# Patient Record
Sex: MALE | Race: WHITE | NOT HISPANIC OR LATINO
[De-identification: names, ages, dates, MRNs, and addresses within clinical notes are randomized per-mention and may not be internally consistent; named-entity substitution may affect disease eponyms.]

---

## 2017-01-10 ENCOUNTER — MEDICATION RENEWAL (OUTPATIENT)
Age: 67
End: 2017-01-10

## 2017-01-11 ENCOUNTER — MEDICATION RENEWAL (OUTPATIENT)
Age: 67
End: 2017-01-11

## 2017-01-17 ENCOUNTER — APPOINTMENT (OUTPATIENT)
Dept: SURGERY | Facility: CLINIC | Age: 67
End: 2017-01-17

## 2017-01-17 VITALS
DIASTOLIC BLOOD PRESSURE: 76 MMHG | TEMPERATURE: 98.4 F | OXYGEN SATURATION: 98 % | RESPIRATION RATE: 17 BRPM | SYSTOLIC BLOOD PRESSURE: 156 MMHG | HEART RATE: 80 BPM

## 2017-01-17 DIAGNOSIS — R19.09 OTHER INTRA-ABDOMINAL AND PELVIC SWELLING, MASS AND LUMP: ICD-10-CM

## 2017-01-17 RX ORDER — SENNOSIDES 8.6 MG TABLETS 8.6 MG/1
8.6 TABLET ORAL
Refills: 0 | Status: ACTIVE | COMMUNITY

## 2017-01-20 ENCOUNTER — NON-APPOINTMENT (OUTPATIENT)
Age: 67
End: 2017-01-20

## 2017-01-20 ENCOUNTER — APPOINTMENT (OUTPATIENT)
Dept: CARDIOLOGY | Facility: CLINIC | Age: 67
End: 2017-01-20

## 2017-01-20 VITALS
WEIGHT: 159 LBS | HEART RATE: 92 BPM | BODY MASS INDEX: 24.1 KG/M2 | OXYGEN SATURATION: 95 % | HEIGHT: 68 IN | SYSTOLIC BLOOD PRESSURE: 136 MMHG | DIASTOLIC BLOOD PRESSURE: 72 MMHG

## 2017-01-20 RX ORDER — SILDENAFIL CITRATE 100 MG/1
100 TABLET, FILM COATED ORAL
Qty: 4 | Refills: 0 | Status: ACTIVE | COMMUNITY
Start: 2015-12-22

## 2017-01-26 ENCOUNTER — MEDICATION RENEWAL (OUTPATIENT)
Age: 67
End: 2017-01-26

## 2017-01-31 ENCOUNTER — OUTPATIENT (OUTPATIENT)
Dept: OUTPATIENT SERVICES | Facility: HOSPITAL | Age: 67
LOS: 1 days | Discharge: HOME | End: 2017-01-31

## 2017-01-31 VITALS
DIASTOLIC BLOOD PRESSURE: 64 MMHG | SYSTOLIC BLOOD PRESSURE: 123 MMHG | RESPIRATION RATE: 17 BRPM | HEIGHT: 68 IN | OXYGEN SATURATION: 97 % | HEART RATE: 80 BPM | WEIGHT: 166.45 LBS | TEMPERATURE: 98 F

## 2017-01-31 DIAGNOSIS — E78.5 HYPERLIPIDEMIA, UNSPECIFIED: ICD-10-CM

## 2017-01-31 DIAGNOSIS — Z01.818 ENCOUNTER FOR OTHER PREPROCEDURAL EXAMINATION: ICD-10-CM

## 2017-01-31 DIAGNOSIS — E11.9 TYPE 2 DIABETES MELLITUS WITHOUT COMPLICATIONS: ICD-10-CM

## 2017-01-31 DIAGNOSIS — K40.90 UNILATERAL INGUINAL HERNIA, WITHOUT OBSTRUCTION OR GANGRENE, NOT SPECIFIED AS RECURRENT: ICD-10-CM

## 2017-01-31 DIAGNOSIS — Z98.890 OTHER SPECIFIED POSTPROCEDURAL STATES: Chronic | ICD-10-CM

## 2017-01-31 DIAGNOSIS — R10.30 LOWER ABDOMINAL PAIN, UNSPECIFIED: ICD-10-CM

## 2017-01-31 LAB
ANION GAP SERPL CALC-SCNC: 6 MMOL/L — SIGNIFICANT CHANGE UP (ref 5–17)
BASOPHILS # BLD AUTO: 0.2 K/UL — SIGNIFICANT CHANGE UP (ref 0–0.2)
BASOPHILS NFR BLD AUTO: 1.8 % — SIGNIFICANT CHANGE UP (ref 0–2)
BUN SERPL-MCNC: 12 MG/DL — SIGNIFICANT CHANGE UP (ref 7–23)
CALCIUM SERPL-MCNC: 9.4 MG/DL — SIGNIFICANT CHANGE UP (ref 8.5–10.1)
CHLORIDE SERPL-SCNC: 106 MMOL/L — SIGNIFICANT CHANGE UP (ref 96–108)
CO2 SERPL-SCNC: 26 MMOL/L — SIGNIFICANT CHANGE UP (ref 22–31)
CREAT SERPL-MCNC: 0.95 MG/DL — SIGNIFICANT CHANGE UP (ref 0.5–1.3)
EOSINOPHIL # BLD AUTO: 0.3 K/UL — SIGNIFICANT CHANGE UP (ref 0–0.5)
EOSINOPHIL NFR BLD AUTO: 3 % — SIGNIFICANT CHANGE UP (ref 0–6)
GLUCOSE SERPL-MCNC: 196 MG/DL — HIGH (ref 70–99)
HCT VFR BLD CALC: 41.4 % — SIGNIFICANT CHANGE UP (ref 39–50)
HGB BLD-MCNC: 14.4 G/DL — SIGNIFICANT CHANGE UP (ref 13–17)
INR BLD: 0.89 RATIO — SIGNIFICANT CHANGE UP (ref 0.88–1.16)
LYMPHOCYTES # BLD AUTO: 1.3 K/UL — SIGNIFICANT CHANGE UP (ref 1–3.3)
LYMPHOCYTES # BLD AUTO: 11.3 % — LOW (ref 13–44)
MCHC RBC-ENTMCNC: 31 PG — SIGNIFICANT CHANGE UP (ref 27–34)
MCHC RBC-ENTMCNC: 35 GM/DL — SIGNIFICANT CHANGE UP (ref 32–36)
MCV RBC AUTO: 88.8 FL — SIGNIFICANT CHANGE UP (ref 80–100)
MONOCYTES # BLD AUTO: 0.7 K/UL — SIGNIFICANT CHANGE UP (ref 0–0.9)
MONOCYTES NFR BLD AUTO: 6 % — SIGNIFICANT CHANGE UP (ref 2–14)
NEUTROPHILS # BLD AUTO: 8.9 K/UL — HIGH (ref 1.8–7.4)
NEUTROPHILS NFR BLD AUTO: 77.9 % — HIGH (ref 43–77)
PCP SPEC-MCNC: SIGNIFICANT CHANGE UP
PLATELET # BLD AUTO: 287 K/UL — SIGNIFICANT CHANGE UP (ref 150–400)
POTASSIUM SERPL-MCNC: 4.1 MMOL/L — SIGNIFICANT CHANGE UP (ref 3.5–5.3)
POTASSIUM SERPL-SCNC: 4.1 MMOL/L — SIGNIFICANT CHANGE UP (ref 3.5–5.3)
PROTHROM AB SERPL-ACNC: 9.9 SEC — LOW (ref 10–13.1)
RBC # BLD: 4.66 M/UL — SIGNIFICANT CHANGE UP (ref 4.2–5.8)
RBC # FLD: 11.4 % — SIGNIFICANT CHANGE UP (ref 11–15)
SODIUM SERPL-SCNC: 138 MMOL/L — SIGNIFICANT CHANGE UP (ref 135–145)
WBC # BLD: 11.4 K/UL — HIGH (ref 3.8–10.5)
WBC # FLD AUTO: 11.4 K/UL — HIGH (ref 3.8–10.5)

## 2017-01-31 RX ORDER — ASPIRIN/CALCIUM CARB/MAGNESIUM 324 MG
1 TABLET ORAL
Qty: 0 | Refills: 0 | COMMUNITY

## 2017-01-31 RX ORDER — CAFFEINE 200 MG
4 TABLET ORAL
Qty: 0 | Refills: 0 | COMMUNITY

## 2017-01-31 RX ORDER — OMEGA-3 ACID ETHYL ESTERS 1 G
1 CAPSULE ORAL
Qty: 0 | Refills: 0 | COMMUNITY

## 2017-01-31 RX ORDER — ZOLPIDEM TARTRATE 10 MG/1
1 TABLET ORAL
Qty: 0 | Refills: 0 | COMMUNITY

## 2017-01-31 NOTE — H&P PST ADULT - NSANTHOSAYNRD_GEN_A_CORE
No. ARY screening performed.  STOP BANG Legend: 0-2 = LOW Risk; 3-4 = INTERMEDIATE Risk; 5-8 = HIGH Risk

## 2017-02-06 ENCOUNTER — APPOINTMENT (OUTPATIENT)
Dept: SURGERY | Facility: HOSPITAL | Age: 67
End: 2017-02-06

## 2017-02-14 ENCOUNTER — APPOINTMENT (OUTPATIENT)
Dept: SURGERY | Facility: CLINIC | Age: 67
End: 2017-02-14

## 2017-05-11 ENCOUNTER — MEDICATION RENEWAL (OUTPATIENT)
Age: 67
End: 2017-05-11

## 2017-05-12 ENCOUNTER — MEDICATION RENEWAL (OUTPATIENT)
Age: 67
End: 2017-05-12

## 2017-07-26 ENCOUNTER — APPOINTMENT (OUTPATIENT)
Dept: CARDIOLOGY | Facility: CLINIC | Age: 67
End: 2017-07-26

## 2017-07-26 RX ORDER — KETOCONAZOLE 20.5 MG/ML
2 SHAMPOO, SUSPENSION TOPICAL
Qty: 120 | Refills: 0 | Status: ACTIVE | COMMUNITY
Start: 2017-07-17

## 2017-09-19 ENCOUNTER — APPOINTMENT (OUTPATIENT)
Dept: CARDIOLOGY | Facility: CLINIC | Age: 67
End: 2017-09-19
Payer: MEDICARE

## 2017-09-19 PROCEDURE — 99214 OFFICE O/P EST MOD 30 MIN: CPT

## 2017-09-19 RX ORDER — POTASSIUM IODIDE 65 MG/ML
65 SOLUTION ORAL
Qty: 30 | Refills: 0 | Status: ACTIVE | COMMUNITY
Start: 2017-09-19

## 2017-10-05 ENCOUNTER — MEDICATION RENEWAL (OUTPATIENT)
Age: 67
End: 2017-10-05

## 2017-10-06 ENCOUNTER — MEDICATION RENEWAL (OUTPATIENT)
Age: 67
End: 2017-10-06

## 2017-10-16 ENCOUNTER — MEDICATION RENEWAL (OUTPATIENT)
Age: 67
End: 2017-10-16

## 2017-10-16 RX ORDER — LANCETS
EACH MISCELLANEOUS
Qty: 10 | Refills: 10 | Status: ACTIVE | COMMUNITY
Start: 2017-10-16 | End: 1900-01-01

## 2017-10-16 RX ORDER — BLOOD SUGAR DIAGNOSTIC
STRIP MISCELLANEOUS
Qty: 100 | Refills: 2 | Status: DISCONTINUED | COMMUNITY
Start: 2016-10-05 | End: 2017-10-16

## 2017-11-02 ENCOUNTER — MEDICATION RENEWAL (OUTPATIENT)
Age: 67
End: 2017-11-02

## 2018-01-29 ENCOUNTER — MEDICATION RENEWAL (OUTPATIENT)
Age: 68
End: 2018-01-29

## 2018-03-26 ENCOUNTER — MEDICATION RENEWAL (OUTPATIENT)
Age: 68
End: 2018-03-26

## 2018-03-28 ENCOUNTER — MEDICATION RENEWAL (OUTPATIENT)
Age: 68
End: 2018-03-28

## 2018-03-28 ENCOUNTER — RX RENEWAL (OUTPATIENT)
Age: 68
End: 2018-03-28

## 2018-05-03 ENCOUNTER — MEDICATION RENEWAL (OUTPATIENT)
Age: 68
End: 2018-05-03

## 2018-05-07 ENCOUNTER — RX RENEWAL (OUTPATIENT)
Age: 68
End: 2018-05-07

## 2018-05-22 ENCOUNTER — MEDICATION RENEWAL (OUTPATIENT)
Age: 68
End: 2018-05-22

## 2018-07-17 ENCOUNTER — RX RENEWAL (OUTPATIENT)
Age: 68
End: 2018-07-17

## 2018-08-07 ENCOUNTER — MEDICATION RENEWAL (OUTPATIENT)
Age: 68
End: 2018-08-07

## 2018-09-11 ENCOUNTER — RX RENEWAL (OUTPATIENT)
Age: 68
End: 2018-09-11

## 2018-09-11 PROBLEM — E78.5 HYPERLIPIDEMIA, UNSPECIFIED: Chronic | Status: ACTIVE | Noted: 2017-01-31

## 2018-09-11 PROBLEM — E11.9 TYPE 2 DIABETES MELLITUS WITHOUT COMPLICATIONS: Chronic | Status: ACTIVE | Noted: 2017-01-31

## 2018-10-01 ENCOUNTER — APPOINTMENT (OUTPATIENT)
Dept: CARDIOLOGY | Facility: CLINIC | Age: 68
End: 2018-10-01
Payer: MEDICARE

## 2018-10-01 ENCOUNTER — NON-APPOINTMENT (OUTPATIENT)
Age: 68
End: 2018-10-01

## 2018-10-01 VITALS
WEIGHT: 179 LBS | HEIGHT: 68 IN | DIASTOLIC BLOOD PRESSURE: 77 MMHG | HEART RATE: 80 BPM | SYSTOLIC BLOOD PRESSURE: 168 MMHG | OXYGEN SATURATION: 96 % | BODY MASS INDEX: 27.13 KG/M2 | TEMPERATURE: 99.8 F

## 2018-10-01 VITALS — DIASTOLIC BLOOD PRESSURE: 74 MMHG | SYSTOLIC BLOOD PRESSURE: 136 MMHG

## 2018-10-01 PROCEDURE — 36415 COLL VENOUS BLD VENIPUNCTURE: CPT

## 2018-10-01 PROCEDURE — 93000 ELECTROCARDIOGRAM COMPLETE: CPT

## 2018-10-01 PROCEDURE — 99214 OFFICE O/P EST MOD 30 MIN: CPT

## 2018-10-02 LAB
25(OH)D3 SERPL-MCNC: 28.1 NG/ML
ALBUMIN SERPL ELPH-MCNC: 5.1 G/DL
ALP BLD-CCNC: 103 U/L
ALT SERPL-CCNC: 25 U/L
ANION GAP SERPL CALC-SCNC: 15 MMOL/L
AST SERPL-CCNC: 14 U/L
BASOPHILS # BLD AUTO: 0.03 K/UL
BASOPHILS NFR BLD AUTO: 0.3 %
BILIRUB SERPL-MCNC: 0.4 MG/DL
BUN SERPL-MCNC: 16 MG/DL
CALCIUM SERPL-MCNC: 11.1 MG/DL
CHLORIDE SERPL-SCNC: 99 MMOL/L
CHOLEST SERPL-MCNC: 225 MG/DL
CHOLEST/HDLC SERPL: 2.4 RATIO
CO2 SERPL-SCNC: 24 MMOL/L
CREAT SERPL-MCNC: 1.04 MG/DL
EOSINOPHIL # BLD AUTO: 0.18 K/UL
EOSINOPHIL NFR BLD AUTO: 1.6 %
GLUCOSE SERPL-MCNC: 152 MG/DL
HBA1C MFR BLD HPLC: 6.8 %
HCT VFR BLD CALC: 45.5 %
HDLC SERPL-MCNC: 93 MG/DL
HGB BLD-MCNC: 15 G/DL
IMM GRANULOCYTES NFR BLD AUTO: 0.2 %
LDLC SERPL CALC-MCNC: 108 MG/DL
LYMPHOCYTES # BLD AUTO: 1.83 K/UL
LYMPHOCYTES NFR BLD AUTO: 15.9 %
MAN DIFF?: NORMAL
MCHC RBC-ENTMCNC: 30.5 PG
MCHC RBC-ENTMCNC: 33 GM/DL
MCV RBC AUTO: 92.5 FL
MONOCYTES # BLD AUTO: 0.68 K/UL
MONOCYTES NFR BLD AUTO: 5.9 %
NEUTROPHILS # BLD AUTO: 8.76 K/UL
NEUTROPHILS NFR BLD AUTO: 76.1 %
PLATELET # BLD AUTO: 382 K/UL
POTASSIUM SERPL-SCNC: 6 MMOL/L
PROT SERPL-MCNC: 7.5 G/DL
PSA FREE FLD-MCNC: 6.2
PSA FREE SERPL-MCNC: 0.16 NG/ML
PSA SERPL-MCNC: 2.58 NG/ML
RBC # BLD: 4.92 M/UL
RBC # FLD: 13.9 %
SODIUM SERPL-SCNC: 138 MMOL/L
T3FREE SERPL-MCNC: 2.46 PG/ML
TRIGL SERPL-MCNC: 122 MG/DL
TSH SERPL-ACNC: 1.46 UIU/ML
WBC # FLD AUTO: 11.5 K/UL

## 2018-10-10 ENCOUNTER — RX RENEWAL (OUTPATIENT)
Age: 68
End: 2018-10-10

## 2019-01-08 ENCOUNTER — MEDICATION RENEWAL (OUTPATIENT)
Age: 69
End: 2019-01-08

## 2019-01-14 ENCOUNTER — MEDICATION RENEWAL (OUTPATIENT)
Age: 69
End: 2019-01-14

## 2019-02-12 ENCOUNTER — MEDICATION RENEWAL (OUTPATIENT)
Age: 69
End: 2019-02-12

## 2019-04-15 ENCOUNTER — MEDICATION RENEWAL (OUTPATIENT)
Age: 69
End: 2019-04-15

## 2019-04-17 ENCOUNTER — MEDICATION RENEWAL (OUTPATIENT)
Age: 69
End: 2019-04-17

## 2019-06-24 ENCOUNTER — MEDICATION RENEWAL (OUTPATIENT)
Age: 69
End: 2019-06-24

## 2019-09-11 ENCOUNTER — RX RENEWAL (OUTPATIENT)
Age: 69
End: 2019-09-11

## 2019-09-11 ENCOUNTER — MEDICATION RENEWAL (OUTPATIENT)
Age: 69
End: 2019-09-11

## 2019-09-25 ENCOUNTER — RX RENEWAL (OUTPATIENT)
Age: 69
End: 2019-09-25

## 2019-11-11 ENCOUNTER — MEDICATION RENEWAL (OUTPATIENT)
Age: 69
End: 2019-11-11

## 2019-12-05 ENCOUNTER — MEDICATION RENEWAL (OUTPATIENT)
Age: 69
End: 2019-12-05

## 2019-12-10 ENCOUNTER — RX RENEWAL (OUTPATIENT)
Age: 69
End: 2019-12-10

## 2019-12-16 ENCOUNTER — MEDICATION RENEWAL (OUTPATIENT)
Age: 69
End: 2019-12-16

## 2019-12-24 ENCOUNTER — RX RENEWAL (OUTPATIENT)
Age: 69
End: 2019-12-24

## 2019-12-24 RX ORDER — DOCUSATE SODIUM 100 MG/1
100 CAPSULE ORAL
Qty: 180 | Refills: 0 | Status: ACTIVE | COMMUNITY
Start: 2019-12-24 | End: 1900-01-01

## 2020-03-17 ENCOUNTER — NON-APPOINTMENT (OUTPATIENT)
Age: 70
End: 2020-03-17

## 2020-03-17 ENCOUNTER — APPOINTMENT (OUTPATIENT)
Dept: CARDIOLOGY | Facility: CLINIC | Age: 70
End: 2020-03-17
Payer: MEDICARE

## 2020-03-17 VITALS
RESPIRATION RATE: 17 BRPM | SYSTOLIC BLOOD PRESSURE: 197 MMHG | BODY MASS INDEX: 25.31 KG/M2 | HEIGHT: 68 IN | WEIGHT: 167 LBS | HEART RATE: 91 BPM | DIASTOLIC BLOOD PRESSURE: 92 MMHG | TEMPERATURE: 98.4 F | OXYGEN SATURATION: 96 %

## 2020-03-17 DIAGNOSIS — R73.9 HYPERGLYCEMIA, UNSPECIFIED: ICD-10-CM

## 2020-03-17 DIAGNOSIS — F17.200 NICOTINE DEPENDENCE, UNSPECIFIED, UNCOMPLICATED: ICD-10-CM

## 2020-03-17 PROCEDURE — 93000 ELECTROCARDIOGRAM COMPLETE: CPT

## 2020-03-17 PROCEDURE — 99215 OFFICE O/P EST HI 40 MIN: CPT

## 2020-03-17 PROCEDURE — 36415 COLL VENOUS BLD VENIPUNCTURE: CPT

## 2020-03-17 RX ORDER — RISPERIDONE 25 MG/2 ML
25 KIT INTRAMUSCULAR
Qty: 1 | Refills: 0 | Status: DISCONTINUED | COMMUNITY
End: 2020-03-17

## 2020-03-17 NOTE — DISCUSSION/SUMMARY
[FreeTextEntry1] : This 70 year-old man with history as above. \par Since his last visit here, he has quit smoking and was allowed to discontinue his Risperdal (which had previously been court ordered).\par \par We discussed the importance of adding ASA, a statin, and an ACEi/ARB to his diabetes regimen.\par \par He reports that he already takes a daily ASA (full dose).\par The statin is important for primary prevention of cardiovascular disease.\par The ACEi/ARB is important to protect against proteinuria. Would also be helpful with blood pressure. He also took significant caffeine this morning, which may be contributing to his blood pressure. We discussed moderating the caffeine intake.\par \par We will consider these medications again going forward.

## 2020-03-17 NOTE — REASON FOR VISIT
[Follow-Up - Clinic] : a clinic follow-up of [Medication Management] : Medication management [FreeTextEntry1] : Mr. Cheema presents today for follow-up. He has been a longstanding patient of Stacie Pinto's at his former practice.

## 2020-03-17 NOTE — PHYSICAL EXAM
[General Appearance - Well Developed] : well developed [Normal Appearance] : normal appearance [Well Groomed] : well groomed [General Appearance - Well Nourished] : well nourished [General Appearance - In No Acute Distress] : no acute distress [Normal Oral Mucosa] : normal oral mucosa [No Oral Pallor] : no oral pallor [No Oral Cyanosis] : no oral cyanosis [Normal Oropharynx] : normal oropharynx [Normal Jugular Venous V Waves Present] : normal jugular venous V waves present [] : no respiratory distress [Respiration, Rhythm And Depth] : normal respiratory rhythm and effort [Exaggerated Use Of Accessory Muscles For Inspiration] : no accessory muscle use [Auscultation Breath Sounds / Voice Sounds] : lungs were clear to auscultation bilaterally [Bowel Sounds] : normal bowel sounds [Abdomen Soft] : soft [Abdomen Tenderness] : non-tender [Abnormal Walk] : normal gait [Gait - Sufficient For Exercise Testing] : the gait was sufficient for exercise testing [Nail Clubbing] : no clubbing of the fingernails [Cyanosis, Localized] : no localized cyanosis [Skin Color & Pigmentation] : normal skin color and pigmentation [No Venous Stasis] : no venous stasis [No Xanthoma] : no  xanthoma was observed [Oriented To Time, Place, And Person] : oriented to person, place, and time [Affect] : the affect was normal [Mood] : the mood was normal [No Anxiety] : not feeling anxious [Conjunctiva] : the conjunctiva were normal in both eyes [PERRL] : pupils were equal in size, round, and reactive to light [EOM Intact] : extraocular movements were intact [5th Left ICS - MCL] : palpated at the 5th LICS in the midclavicular line [Normal] : normal [No Precordial Heave] : no precordial heave was noted [Normal Rate] : normal [Rhythm Regular] : regular [Normal S1] : normal S1 [Normal S2] : normal S2 [No Gallop] : no gallop heard [No Murmur] : no murmurs heard [2+] : left 2+ [No Abnormalities] : the abdominal aorta was not enlarged and no bruit was heard [No Pitting Edema] : no pitting edema present [Yellow Sclera (Icteric)] : no scleral icterus was seen [Right Carotid Bruit] : no bruit heard over the right carotid [Left Carotid Bruit] : no bruit heard over the left carotid

## 2020-03-17 NOTE — HISTORY OF PRESENT ILLNESS
[FreeTextEntry1] : Patient presents today in his usual state of health. He does not drink coffee, but rather, he takes several caffeine pills per day. He reports they make him feel more awake. He denies chest pains, shortness of breath, or palpitations. He exercises regularly, but he plans to increase the amount of weight training he is doing. \par \par He has a Risperdal induced hyperglycemia. The Risperdal is currently at 25 mg every-two-weeks as court ordered. This court order was rescinded in August 2019.\par \par March 2020 - Patient presents here today for evaluation and medication renewals including his diabetes medications. He has stopped Risperdal, but he has continued metformin, Januvia, and glipizide. He has not had labs checked in almost 18 months.

## 2020-03-19 LAB
25(OH)D3 SERPL-MCNC: 30.5 NG/ML
ALBUMIN SERPL ELPH-MCNC: 4.8 G/DL
ALP BLD-CCNC: 104 U/L
ALT SERPL-CCNC: 14 U/L
ANION GAP SERPL CALC-SCNC: 16 MMOL/L
AST SERPL-CCNC: 13 U/L
BASOPHILS # BLD AUTO: 0.05 K/UL
BASOPHILS NFR BLD AUTO: 0.6 %
BILIRUB SERPL-MCNC: 0.2 MG/DL
BUN SERPL-MCNC: 17 MG/DL
CALCIUM SERPL-MCNC: 10.3 MG/DL
CHLORIDE SERPL-SCNC: 101 MMOL/L
CHOLEST SERPL-MCNC: 205 MG/DL
CHOLEST/HDLC SERPL: 2.8 RATIO
CO2 SERPL-SCNC: 21 MMOL/L
CREAT SERPL-MCNC: 1 MG/DL
EOSINOPHIL # BLD AUTO: 0.02 K/UL
EOSINOPHIL NFR BLD AUTO: 0.2 %
ESTIMATED AVERAGE GLUCOSE: 134 MG/DL
GLUCOSE SERPL-MCNC: 123 MG/DL
HBA1C MFR BLD HPLC: 6.3 %
HCT VFR BLD CALC: 45.5 %
HDLC SERPL-MCNC: 74 MG/DL
HGB BLD-MCNC: 14.8 G/DL
IMM GRANULOCYTES NFR BLD AUTO: 0.2 %
LDLC SERPL CALC-MCNC: 111 MG/DL
LYMPHOCYTES # BLD AUTO: 1.03 K/UL
LYMPHOCYTES NFR BLD AUTO: 12.2 %
MAN DIFF?: NORMAL
MCHC RBC-ENTMCNC: 29.8 PG
MCHC RBC-ENTMCNC: 32.5 GM/DL
MCV RBC AUTO: 91.7 FL
MONOCYTES # BLD AUTO: 0.7 K/UL
MONOCYTES NFR BLD AUTO: 8.3 %
NEUTROPHILS # BLD AUTO: 6.64 K/UL
NEUTROPHILS NFR BLD AUTO: 78.5 %
PLATELET # BLD AUTO: 314 K/UL
POTASSIUM SERPL-SCNC: 4.2 MMOL/L
PROT SERPL-MCNC: 7.2 G/DL
PSA FREE FLD-MCNC: 18 %
PSA FREE SERPL-MCNC: 0.19 NG/ML
PSA SERPL-MCNC: 1.09 NG/ML
RBC # BLD: 4.96 M/UL
RBC # FLD: 13 %
SODIUM SERPL-SCNC: 137 MMOL/L
TRIGL SERPL-MCNC: 104 MG/DL
TSH SERPL-ACNC: 1.21 UIU/ML
WBC # FLD AUTO: 8.46 K/UL

## 2020-05-27 ENCOUNTER — NON-APPOINTMENT (OUTPATIENT)
Age: 70
End: 2020-05-27

## 2020-05-27 ENCOUNTER — APPOINTMENT (OUTPATIENT)
Dept: CARDIOLOGY | Facility: CLINIC | Age: 70
End: 2020-05-27
Payer: MEDICARE

## 2020-05-27 VITALS — OXYGEN SATURATION: 99 % | HEART RATE: 101 BPM

## 2020-05-27 VITALS
OXYGEN SATURATION: 95 % | DIASTOLIC BLOOD PRESSURE: 81 MMHG | SYSTOLIC BLOOD PRESSURE: 156 MMHG | BODY MASS INDEX: 20 KG/M2 | WEIGHT: 132 LBS | HEART RATE: 115 BPM | HEIGHT: 68 IN | TEMPERATURE: 95.9 F

## 2020-05-27 PROCEDURE — 93000 ELECTROCARDIOGRAM COMPLETE: CPT

## 2020-05-27 PROCEDURE — 99214 OFFICE O/P EST MOD 30 MIN: CPT

## 2020-05-27 NOTE — PHYSICAL EXAM
[General Appearance - Well Developed] : well developed [Normal Appearance] : normal appearance [General Appearance - Well Nourished] : well nourished [Well Groomed] : well groomed [General Appearance - In No Acute Distress] : no acute distress [Normal Oral Mucosa] : normal oral mucosa [No Oral Pallor] : no oral pallor [No Oral Cyanosis] : no oral cyanosis [Normal Oropharynx] : normal oropharynx [Normal Jugular Venous V Waves Present] : normal jugular venous V waves present [Respiration, Rhythm And Depth] : normal respiratory rhythm and effort [] : no respiratory distress [Bowel Sounds] : normal bowel sounds [Exaggerated Use Of Accessory Muscles For Inspiration] : no accessory muscle use [Auscultation Breath Sounds / Voice Sounds] : lungs were clear to auscultation bilaterally [Abdomen Tenderness] : non-tender [Abdomen Soft] : soft [Gait - Sufficient For Exercise Testing] : the gait was sufficient for exercise testing [Abnormal Walk] : normal gait [Cyanosis, Localized] : no localized cyanosis [Nail Clubbing] : no clubbing of the fingernails [No Venous Stasis] : no venous stasis [Skin Color & Pigmentation] : normal skin color and pigmentation [No Xanthoma] : no  xanthoma was observed [Oriented To Time, Place, And Person] : oriented to person, place, and time [Affect] : the affect was normal [Mood] : the mood was normal [Conjunctiva] : the conjunctiva were normal in both eyes [No Anxiety] : not feeling anxious [PERRL] : pupils were equal in size, round, and reactive to light [EOM Intact] : extraocular movements were intact [5th Left ICS - MCL] : palpated at the 5th LICS in the midclavicular line [No Precordial Heave] : no precordial heave was noted [Normal] : normal [Normal Rate] : normal [Rhythm Regular] : regular [Normal S1] : normal S1 [Normal S2] : normal S2 [No Gallop] : no gallop heard [No Murmur] : no murmurs heard [2+] : left 2+ [No Pitting Edema] : no pitting edema present [No Abnormalities] : the abdominal aorta was not enlarged and no bruit was heard [Eyelids - No Xanthelasma] : the eyelids demonstrated no xanthelasmas [Normal Conjunctiva] : the conjunctiva exhibited no abnormalities [Heart Rate And Rhythm] : heart rate and rhythm were normal [Heart Sounds] : normal S1 and S2 [Murmurs] : no murmurs present [Yellow Sclera (Icteric)] : no scleral icterus was seen [Right Carotid Bruit] : no bruit heard over the right carotid [Left Carotid Bruit] : no bruit heard over the left carotid

## 2020-05-27 NOTE — DISCUSSION/SUMMARY
[FreeTextEntry1] : This 70 year-old man with history as above. \par Since his last visit here, he has quit smoking and was allowed to discontinue his Risperdal (which had previously been court ordered).\par \par We discussed the importance of adding ASA, a statin, and an ACEi/ARB to his diabetes regimen.\par \par He reports that he already takes a daily ASA (full dose).\par The statin is important for primary prevention of cardiovascular disease.\par The ACEi/ARB is important to protect against proteinuria. Would also be helpful with blood pressure. He also took significant caffeine this morning, which may be contributing to his blood pressure. We discussed moderating the caffeine intake.\par \par We will consider these medications again going forward.\par The patient was told in no uncertain terms that he must follow-up with an endocrinologist.  He was given the name and phone number of Dr. Lomas.

## 2020-05-27 NOTE — HISTORY OF PRESENT ILLNESS
[FreeTextEntry1] : Patient presents today in his usual state of health. He does not drink coffee, but rather, he takes several caffeine pills per day. He reports they make him feel more awake. He denies chest pains, shortness of breath, or palpitations. He exercises regularly, but he plans to increase the amount of weight training he is doing. \par \par He has a Risperdal induced hyperglycemia. The Risperdal is currently at 25 mg every-two-weeks as court ordered. This court order was rescinded in August 2019.\par \par March 2020 - Patient presents here today for evaluation and medication renewals including his diabetes medications. He has stopped Risperdal, but he has continued metformin, Januvia, and glipizide. He has not had labs checked in almost 18 months.\par May 27, 2020: The patient states that his diabetes and is out of control.  He states that he has been taking both the Metformin the glipizide and the Januvia.  He has not been to an endocrinologist.  He states that his last glucose was 297 despite taking these medicines.  He has had an apparent weight loss.\par

## 2020-08-17 ENCOUNTER — APPOINTMENT (OUTPATIENT)
Dept: UROLOGY | Facility: CLINIC | Age: 70
End: 2020-08-17
Payer: MEDICARE

## 2020-08-17 VITALS — TEMPERATURE: 97.8 F

## 2020-08-17 DIAGNOSIS — R00.0 TACHYCARDIA, UNSPECIFIED: ICD-10-CM

## 2020-08-17 DIAGNOSIS — F15.20 OTHER STIMULANT DEPENDENCE, UNCOMPLICATED: ICD-10-CM

## 2020-08-17 DIAGNOSIS — G47.9 SLEEP DISORDER, UNSPECIFIED: ICD-10-CM

## 2020-08-17 PROCEDURE — 51798 US URINE CAPACITY MEASURE: CPT

## 2020-08-17 PROCEDURE — 99204 OFFICE O/P NEW MOD 45 MIN: CPT | Mod: 25

## 2020-08-17 RX ORDER — PANTOPRAZOLE 40 MG/1
40 TABLET, DELAYED RELEASE ORAL
Qty: 30 | Refills: 0 | Status: ACTIVE | COMMUNITY
Start: 2020-07-05

## 2020-08-17 RX ORDER — BETHANECHOL CHLORIDE 10 MG/1
10 TABLET ORAL
Qty: 90 | Refills: 0 | Status: ACTIVE | COMMUNITY
Start: 2020-07-19

## 2020-08-17 RX ORDER — CHOLECALCIFEROL (VITAMIN D3) 1250 MCG
1.25 MG CAPSULE ORAL
Qty: 2 | Refills: 0 | Status: ACTIVE | COMMUNITY
Start: 2020-07-26

## 2020-08-17 NOTE — LETTER BODY
[FreeTextEntry1] : Trey Moore MD\par [address]\[place] [address]\par [phone]\par \par Dear Dr. Moore,\par \par Reason for Visit: BPH.\par \par This is a 70 year-old gentleman with history of head trauma with urinary retention presenting with symptoms of BPH. Patient is here today for evaluation. He currently takes Flomax and denies any urinary complaints. He is overall well. He denies any hematuria or urinary incontinence. His symptoms are aggravated by hydration. He reports no pain. All other review of systems are negative. His father had prostate cancer. He has no previous surgical history. Past medical history, family history and social history were inquired and were noncontributory to current condition. The patient does not use tobacco or drink alcohol. He is a former smoker. Medications and allergies were reviewed. He has no known allergies to medication. \par \par On examination, the patient is a healthy-appearing gentleman in no acute distress. He is alert and oriented follows commands. He has normal mood and affect. He is normocephalic. Neck is supple. Oral no thrush Respirations are unlabored. His abdomen is soft and nontender. Bladder is nonpalpable. No CVA tenderness. Neurologically he is grossly intact. No peripheral edema. Skin without gross abnormality. He has normal male external genitalia. Normal meatus. Bilateral testes are descended intrascrotally and normal to palpation. On rectal examination, there is normal sphincter tone. The prostate is clinically benign without focal induration or nodularity.\par \par Post-void residual on bladder scan today was 150 cc.\par \par ASSESSMENT: BPH\par \par I counseled the patient on the various etiology of his symptoms. I discussed the natural history of BPH and the treatment options available. I discussed the options of conservative management with fluid in dietary restrictions, herbal therapy, medical therapy, and minimally invasive procedures.  Risk and benefits were discussed. I answered his questions. I recommended he continue taking Flomax. I discussed the potential side effects of the medication. I counseled the patient on its use and side effects. If the patient develops any side effects, the patient will discontinue the medication and contact me. I recommend he obtain a BMP, PSA, culture, and urinalysis today for further evaluation. Risks and alternatives were discussed. I answered the patient questions. The patient will follow-up as directed and will contact me with any questions or concerns. Thank you for the opportunity to participate in the care of Mr. SMITH. I will keep you updated on his progress.\par \par Plan: Continue Flomax. BMP. PSA. Culture. Urinalysis.Follow up in 6 months.

## 2020-08-17 NOTE — ADDENDUM
[FreeTextEntry1] : Entered by Nj Lorenzo, acting as scribe for Dr. Trey Joyce.\par \par The documentation recorded by the scribe accurately reflects the service I personally performed and the decisions made by me.

## 2020-08-19 RX ORDER — BETHANECHOL CHLORIDE 10 MG/1
10 TABLET ORAL EVERY 6 HOURS
Qty: 360 | Refills: 3 | Status: COMPLETED | COMMUNITY
Start: 2020-08-19 | End: 2020-08-19

## 2020-09-01 ENCOUNTER — APPOINTMENT (OUTPATIENT)
Dept: NEUROLOGY | Facility: CLINIC | Age: 70
End: 2020-09-01
Payer: MEDICARE

## 2020-09-01 VITALS
HEIGHT: 68 IN | HEART RATE: 79 BPM | SYSTOLIC BLOOD PRESSURE: 203 MMHG | WEIGHT: 154 LBS | DIASTOLIC BLOOD PRESSURE: 79 MMHG | BODY MASS INDEX: 23.34 KG/M2

## 2020-09-01 VITALS — TEMPERATURE: 97.6 F

## 2020-09-01 DIAGNOSIS — S00.90XA UNSPECIFIED SUPERFICIAL INJURY OF UNSPECIFIED PART OF HEAD, INITIAL ENCOUNTER: ICD-10-CM

## 2020-09-01 PROCEDURE — 99203 OFFICE O/P NEW LOW 30 MIN: CPT

## 2020-09-01 NOTE — HISTORY OF PRESENT ILLNESS
[FreeTextEntry1] : 70-year-old man referred to my office for evaluation of head trauma. He claims his gait was unsteady as a result of poorly controlled diabetes mellitus and he fell striking the back of his head without loss of consciousness. He had a laceration that was sutured and he was admitted to the [facility]. He claims he was there for several weeks and transferred to the [facility]. No records are available for my review.\par \par He has a history of a psychiatric disorder was on neuroleptics.\par \par Currently lives alone. Has no contact with family. 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

## 2020-09-01 NOTE — PHYSICAL EXAM
[FreeTextEntry1] : His systolic blood pressure was elevated today and he claims is secondary to excessive caffeine intake.He is alert and oriented. Speech is pressured and tangential. No aphasia. Short-term recall intact able to spell in reverse knowing of a 5 letter word. Visual fields are full to confrontation. Pupils are equal and constrict to light. Extraocular movements intact. No hearing loss. Neck is supple. No bruits heard. No focal sensorimotor deficits. Gait is steady.

## 2020-09-20 ENCOUNTER — RX RENEWAL (OUTPATIENT)
Age: 70
End: 2020-09-20

## 2020-09-21 ENCOUNTER — APPOINTMENT (OUTPATIENT)
Dept: CARDIOLOGY | Facility: CLINIC | Age: 70
End: 2020-09-21
Payer: MEDICARE

## 2020-09-21 ENCOUNTER — NON-APPOINTMENT (OUTPATIENT)
Age: 70
End: 2020-09-21

## 2020-09-21 ENCOUNTER — LABORATORY RESULT (OUTPATIENT)
Age: 70
End: 2020-09-21

## 2020-09-21 VITALS
WEIGHT: 154 LBS | HEIGHT: 68 IN | BODY MASS INDEX: 23.34 KG/M2 | DIASTOLIC BLOOD PRESSURE: 80 MMHG | HEART RATE: 79 BPM | SYSTOLIC BLOOD PRESSURE: 173 MMHG | OXYGEN SATURATION: 98 %

## 2020-09-21 DIAGNOSIS — Z11.59 ENCOUNTER FOR SCREENING FOR OTHER VIRAL DISEASES: ICD-10-CM

## 2020-09-21 DIAGNOSIS — R39.89 OTHER SYMPTOMS AND SIGNS INVOLVING THE GENITOURINARY SYSTEM: ICD-10-CM

## 2020-09-21 PROCEDURE — 99214 OFFICE O/P EST MOD 30 MIN: CPT

## 2020-09-21 PROCEDURE — 36415 COLL VENOUS BLD VENIPUNCTURE: CPT

## 2020-09-21 PROCEDURE — 93000 ELECTROCARDIOGRAM COMPLETE: CPT

## 2020-09-21 RX ORDER — SIMVASTATIN 20 MG/1
20 TABLET, FILM COATED ORAL
Qty: 10 | Refills: 0 | Status: DISCONTINUED | COMMUNITY
Start: 2020-07-09 | End: 2020-09-21

## 2020-09-21 RX ORDER — TAMSULOSIN HYDROCHLORIDE 0.4 MG/1
0.4 CAPSULE ORAL
Qty: 90 | Refills: 3 | Status: DISCONTINUED | OUTPATIENT
Start: 2020-07-16 | End: 2020-09-21

## 2020-09-21 RX ORDER — NITROFURANTOIN (MONOHYDRATE/MACROCRYSTALS) 25; 75 MG/1; MG/1
100 CAPSULE ORAL
Qty: 10 | Refills: 0 | Status: DISCONTINUED | COMMUNITY
Start: 2020-07-21 | End: 2020-09-21

## 2020-09-21 NOTE — REASON FOR VISIT
[Follow-Up - Clinic] : a clinic follow-up of [Medication Management] : Medication management [FreeTextEntry1] : Mr. Cheema presents today for follow-up. He has been a longstanding patient of Stacie Cotton at his former practice.\par \par September 2020 - Patient returns today in his usual state of health. He believes he had a recent UTI which was treated with an antibiotic, but he continues to have urethral pain. He was seen by urology, but he does not want to go back there unless he knows he has a UTI.\par Since his last visit here, he fell and hit his head (no loss of consciousness). He attributes this fall to poor glycemic control.

## 2020-09-21 NOTE — DISCUSSION/SUMMARY
[FreeTextEntry1] : This 70 year-old man with history as above. \par Since his last visit here, he has quit smoking and was allowed to discontinue his Risperdal (which had previously been court ordered).\par \par He reports that he already takes a daily ASA (full dose).\par We discussed the importance of adding a statin, and an ACEi/ARB to his diabetes regimen.\par \par The statin is important for primary prevention of cardiovascular disease. Patient refuses statin.\par The ACEi/ARB is important to protect against proteinuria. Would also be helpful with blood pressure. He also took significant caffeine this morning, which may be contributing to his blood pressure. We discussed moderating the caffeine intake. He refuses blood pressure medication at this time.\par We will consider these medications again going forward.\par \par Patient declined flu shot at this time.

## 2020-09-21 NOTE — PHYSICAL EXAM
[General Appearance - Well Developed] : well developed [Normal Appearance] : normal appearance [Well Groomed] : well groomed [General Appearance - Well Nourished] : well nourished [General Appearance - In No Acute Distress] : no acute distress [Normal Conjunctiva] : the conjunctiva exhibited no abnormalities [Eyelids - No Xanthelasma] : the eyelids demonstrated no xanthelasmas [Normal Oral Mucosa] : normal oral mucosa [No Oral Pallor] : no oral pallor [No Oral Cyanosis] : no oral cyanosis [Normal Oropharynx] : normal oropharynx [Normal Jugular Venous V Waves Present] : normal jugular venous V waves present [] : no respiratory distress [Respiration, Rhythm And Depth] : normal respiratory rhythm and effort [Exaggerated Use Of Accessory Muscles For Inspiration] : no accessory muscle use [Auscultation Breath Sounds / Voice Sounds] : lungs were clear to auscultation bilaterally [Heart Rate And Rhythm] : heart rate and rhythm were normal [Heart Sounds] : normal S1 and S2 [Murmurs] : no murmurs present [Bowel Sounds] : normal bowel sounds [Abdomen Soft] : soft [Abdomen Tenderness] : non-tender [Abnormal Walk] : normal gait [Gait - Sufficient For Exercise Testing] : the gait was sufficient for exercise testing [Nail Clubbing] : no clubbing of the fingernails [Cyanosis, Localized] : no localized cyanosis [Skin Color & Pigmentation] : normal skin color and pigmentation [No Venous Stasis] : no venous stasis [No Xanthoma] : no  xanthoma was observed [Oriented To Time, Place, And Person] : oriented to person, place, and time [Affect] : the affect was normal [Mood] : the mood was normal [No Anxiety] : not feeling anxious [Conjunctiva] : the conjunctiva were normal in both eyes [PERRL] : pupils were equal in size, round, and reactive to light [EOM Intact] : extraocular movements were intact [5th Left ICS - MCL] : palpated at the 5th LICS in the midclavicular line [Normal] : normal [No Precordial Heave] : no precordial heave was noted [Normal Rate] : normal [Rhythm Regular] : regular [Normal S1] : normal S1 [Normal S2] : normal S2 [No Gallop] : no gallop heard [No Murmur] : no murmurs heard [2+] : left 2+ [No Abnormalities] : the abdominal aorta was not enlarged and no bruit was heard [No Pitting Edema] : no pitting edema present [FreeTextEntry1] : blister on left forearm  [Yellow Sclera (Icteric)] : no scleral icterus was seen [Right Carotid Bruit] : no bruit heard over the right carotid [Left Carotid Bruit] : no bruit heard over the left carotid

## 2020-09-22 ENCOUNTER — LABORATORY RESULT (OUTPATIENT)
Age: 70
End: 2020-09-22

## 2020-09-22 LAB
25(OH)D3 SERPL-MCNC: 52.7 NG/ML
ALBUMIN SERPL ELPH-MCNC: 5 G/DL
ALP BLD-CCNC: 123 U/L
ALT SERPL-CCNC: 27 U/L
ANION GAP SERPL CALC-SCNC: 14 MMOL/L
APPEARANCE: ABNORMAL
AST SERPL-CCNC: 21 U/L
BASOPHILS # BLD AUTO: 0.08 K/UL
BASOPHILS NFR BLD AUTO: 0.9 %
BILIRUB SERPL-MCNC: <0.2 MG/DL
BILIRUBIN URINE: NEGATIVE
BLOOD URINE: ABNORMAL
BUN SERPL-MCNC: 34 MG/DL
CALCIUM SERPL-MCNC: 10.5 MG/DL
CHLORIDE SERPL-SCNC: 97 MMOL/L
CHOLEST SERPL-MCNC: 249 MG/DL
CHOLEST/HDLC SERPL: 2.9 RATIO
CO2 SERPL-SCNC: 26 MMOL/L
COLOR: YELLOW
CREAT SERPL-MCNC: 1.46 MG/DL
CREAT SPEC-SCNC: 28 MG/DL
EOSINOPHIL # BLD AUTO: 0.2 K/UL
EOSINOPHIL NFR BLD AUTO: 2.3 %
ESTIMATED AVERAGE GLUCOSE: 146 MG/DL
GLUCOSE QUALITATIVE U: NEGATIVE
GLUCOSE SERPL-MCNC: 157 MG/DL
HBA1C MFR BLD HPLC: 6.7 %
HCT VFR BLD CALC: 43.4 %
HDLC SERPL-MCNC: 84 MG/DL
HGB BLD-MCNC: 13.1 G/DL
IMM GRANULOCYTES NFR BLD AUTO: 0.3 %
KETONES URINE: NEGATIVE
LDLC SERPL CALC-MCNC: 144 MG/DL
LEUKOCYTE ESTERASE URINE: ABNORMAL
LYMPHOCYTES # BLD AUTO: 0.81 K/UL
LYMPHOCYTES NFR BLD AUTO: 9.2 %
MAN DIFF?: NORMAL
MCHC RBC-ENTMCNC: 28.5 PG
MCHC RBC-ENTMCNC: 30.2 GM/DL
MCV RBC AUTO: 94.3 FL
MICROALBUMIN 24H UR DL<=1MG/L-MCNC: 5.7 MG/DL
MICROALBUMIN/CREAT 24H UR-RTO: 206 MG/G
MONOCYTES # BLD AUTO: 0.97 K/UL
MONOCYTES NFR BLD AUTO: 11 %
NEUTROPHILS # BLD AUTO: 6.72 K/UL
NEUTROPHILS NFR BLD AUTO: 76.3 %
NITRITE URINE: POSITIVE
PH URINE: 6.5
PLATELET # BLD AUTO: 359 K/UL
POTASSIUM SERPL-SCNC: 5.4 MMOL/L
PROT SERPL-MCNC: 7.7 G/DL
PROTEIN URINE: NORMAL
RBC # BLD: 4.6 M/UL
RBC # FLD: 15.6 %
SARS-COV-2 IGG SERPL IA-ACNC: 0.08 INDEX
SARS-COV-2 IGG SERPL QL IA: NEGATIVE
SODIUM SERPL-SCNC: 137 MMOL/L
SPECIFIC GRAVITY URINE: 1.01
TRIGL SERPL-MCNC: 99 MG/DL
TSH SERPL-ACNC: 2.62 UIU/ML
UROBILINOGEN URINE: NORMAL
WBC # FLD AUTO: 8.81 K/UL

## 2020-12-24 ENCOUNTER — RX RENEWAL (OUTPATIENT)
Age: 70
End: 2020-12-24

## 2021-02-17 ENCOUNTER — APPOINTMENT (OUTPATIENT)
Dept: CARDIOLOGY | Facility: CLINIC | Age: 71
End: 2021-02-17
Payer: MEDICARE

## 2021-02-17 PROCEDURE — 99214 OFFICE O/P EST MOD 30 MIN: CPT | Mod: 95

## 2021-02-22 NOTE — REASON FOR VISIT
[Follow-Up - Clinic] : a clinic follow-up of [Medication Management] : Medication management [FreeTextEntry1] : Mr. Cheema presents today for follow-up. He has been a longstanding patient of Stacie Cotton at his former practice.\par \par February 2021 - \par TeleHealth Encounter\par Initiated by: Patient election for TeleHealth visit\par Patient was consented for TeleHealth visit\par Patient Location: Home\par Physician Location: Office ([address] [place] [address], [address], [place], [place], [address])\par Duration of Encounter: 30 minutes, at least 50% of which was spent in direct counseling and coordination of care.\par \par Requesting Lunesta 3 mg renewal sent to [facility] [phone], (patient reports he changed from Ambien to Lunesta). \par He believes his [employer] contract is going to be cancelled as of 3/2/2021.\par \par Medicaid [id number], card number [id number]

## 2021-02-22 NOTE — DISCUSSION/SUMMARY
[FreeTextEntry1] : This 70 year-old man with history as above. \par He has quit smoking and was allowed to discontinue his Risperdal (which had previously been court ordered).\par \par He reports that he already takes a daily ASA (full dose).\par We discussed the importance of adding a statin, and an ACEi/ARB to his diabetes regimen.\par \par The statin is important for primary prevention of cardiovascular disease. Patient refuses statin.\par The ACEi/ARB is important to protect against proteinuria. Would also be helpful with blood pressure. He also took significant caffeine this morning, which may be contributing to his blood pressure. We discussed moderating the caffeine intake. He refuses blood pressure medication at this time.\par We will consider these medications again going forward.

## 2021-02-22 NOTE — HISTORY OF PRESENT ILLNESS
[FreeTextEntry1] : Patient presents today in his usual state of health. He does not drink coffee, but rather, he takes several caffeine pills per day. He reports they make him feel more awake. He denies chest pains, shortness of breath, or palpitations. He exercises regularly, but he plans to increase the amount of weight training he is doing. \par \par He has a Risperdal induced hyperglycemia. The Risperdal is currently at 25 mg every-two-weeks as court ordered. This court order was rescinded in August 2019.\par \par March 2020 - Patient presents here today for evaluation and medication renewals including his diabetes medications. He has stopped Risperdal, but he has continued metformin, Januvia, and glipizide. He has not had labs checked in almost 18 months.\par May 27, 2020: The patient states that his diabetes and is out of control.  He states that he has been taking both the Metformin the glipizide and the Januvia.  He has not been to an endocrinologist.  He states that his last glucose was 297 despite taking these medicines.  He has had an apparent weight loss.\par \par September 2020 - Patient returns today in his usual state of health. He believes he had a recent UTI which was treated with an antibiotic, but he continues to have urethral pain. He was seen by urology, but he does not want to go back there unless he knows he has a UTI.\par Since his last visit here, he fell and hit his head (no loss of consciousness). He attributes this fall to poor glycemic control.

## 2021-04-08 ENCOUNTER — RX RENEWAL (OUTPATIENT)
Age: 71
End: 2021-04-08

## 2021-04-11 ENCOUNTER — RX RENEWAL (OUTPATIENT)
Age: 71
End: 2021-04-11

## 2021-08-02 ENCOUNTER — RX RENEWAL (OUTPATIENT)
Age: 71
End: 2021-08-02

## 2021-10-14 ENCOUNTER — NON-APPOINTMENT (OUTPATIENT)
Age: 71
End: 2021-10-14

## 2021-10-29 ENCOUNTER — RX RENEWAL (OUTPATIENT)
Age: 71
End: 2021-10-29

## 2022-01-18 RX ORDER — COVID19 TEST ADM.BY PHARMACIST
MISCELLANEOUS MISCELLANEOUS
Qty: 1 | Refills: 0 | Status: ACTIVE | COMMUNITY
Start: 2022-01-17

## 2022-04-11 PROBLEM — Z11.59 SCREENING FOR VIRAL DISEASE: Status: ACTIVE | Noted: 2020-09-21

## 2022-05-03 ENCOUNTER — RX RENEWAL (OUTPATIENT)
Age: 72
End: 2022-05-03

## 2022-08-18 ENCOUNTER — RX RENEWAL (OUTPATIENT)
Age: 72
End: 2022-08-18

## 2022-09-15 ENCOUNTER — APPOINTMENT (OUTPATIENT)
Dept: CARDIOLOGY | Facility: CLINIC | Age: 72
End: 2022-09-15
Payer: MEDICARE

## 2022-09-15 ENCOUNTER — LABORATORY RESULT (OUTPATIENT)
Age: 72
End: 2022-09-15

## 2022-09-15 ENCOUNTER — NON-APPOINTMENT (OUTPATIENT)
Age: 72
End: 2022-09-15

## 2022-09-15 VITALS
SYSTOLIC BLOOD PRESSURE: 187 MMHG | DIASTOLIC BLOOD PRESSURE: 78 MMHG | BODY MASS INDEX: 23.49 KG/M2 | HEIGHT: 68 IN | OXYGEN SATURATION: 95 % | HEART RATE: 84 BPM | WEIGHT: 155 LBS | RESPIRATION RATE: 17 BRPM

## 2022-09-15 DIAGNOSIS — N40.1 BENIGN PROSTATIC HYPERPLASIA WITH LOWER URINARY TRACT SYMPMS: ICD-10-CM

## 2022-09-15 DIAGNOSIS — N13.8 BENIGN PROSTATIC HYPERPLASIA WITH LOWER URINARY TRACT SYMPMS: ICD-10-CM

## 2022-09-15 DIAGNOSIS — Z01.810 ENCOUNTER FOR PREPROCEDURAL CARDIOVASCULAR EXAMINATION: ICD-10-CM

## 2022-09-15 DIAGNOSIS — R23.8 OTHER SKIN CHANGES: ICD-10-CM

## 2022-09-15 PROCEDURE — 93000 ELECTROCARDIOGRAM COMPLETE: CPT | Mod: NC

## 2022-09-15 PROCEDURE — 99215 OFFICE O/P EST HI 40 MIN: CPT

## 2022-09-15 NOTE — DISCUSSION/SUMMARY
[EKG obtained to assist in diagnosis and management of assessed problem(s)] : EKG obtained to assist in diagnosis and management of assessed problem(s) [FreeTextEntry1] : This 72 year-old man with history as above. \par He has quit smoking and was allowed to discontinue his Risperdal (which had previously been court ordered).\par \par We discussed the importance of adding a statin, and an ACEi/ARB to his diabetes regimen.\par \par The statin is important for primary prevention of cardiovascular disease. Patient refuses statin.\par The ACEi/ARB is important to protect against proteinuria. Would also be helpful with blood pressure. He also took significant caffeine this morning, which may be contributing to his blood pressure. We discussed moderating the caffeine intake. He refuses blood pressure medication at this time.\par We will consider these medications again going forward. \par \par Patient is capable of > 4 METS at baseline.\par He is without acute coronary syndrome, decompensated heart failure, dangerous arrhythmia, or critical valvular stenosis. \par No further cardiovascular testing is necessary prior to proceeding with inguinal hernia repair.

## 2022-09-15 NOTE — CARDIOLOGY SUMMARY
[de-identified] : 1/20/2017, sinus 80 bpm with left atrial enlargement, normal QTc \par 10/1/2018, sinus 74 bpm with left atrial enlargement, normal QTc

## 2022-09-15 NOTE — REASON FOR VISIT
[CV Risk Factors and Non-Cardiac Disease] : CV risk factors and non-cardiac disease [FreeTextEntry1] : September 2022 - Patient returns to the office today for the first time in more than a year. He is hoping to pursue left inguinal hernia surgery with Caleb Cisse MD at [facility]. The surgery is not yet scheduled.\par He has no new cardiovascular complaints. He specifically denies chest pain, shortness of breath, palpitations, and lower extremity swelling.

## 2022-09-15 NOTE — PHYSICAL EXAM
[Well Developed] : well developed [Well Nourished] : well nourished [No Acute Distress] : no acute distress [Normal Conjunctiva] : normal conjunctiva [Normal Venous Pressure] : normal venous pressure [No Carotid Bruit] : no carotid bruit [Normal S1, S2] : normal S1, S2 [No Murmur] : no murmur [No Rub] : no rub [No Gallop] : no gallop [Clear Lung Fields] : clear lung fields [Good Air Entry] : good air entry [No Respiratory Distress] : no respiratory distress  [Soft] : abdomen soft [Non Tender] : non-tender [No Masses/organomegaly] : no masses/organomegaly [Normal Bowel Sounds] : normal bowel sounds [Normal Gait] : normal gait [No Edema] : no edema [No Cyanosis] : no cyanosis [No Clubbing] : no clubbing [No Varicosities] : no varicosities [No Rash] : no rash [No Skin Lesions] : no skin lesions [Moves all extremities] : moves all extremities [No Focal Deficits] : no focal deficits [Alert and Oriented] : alert and oriented [Normal memory] : normal memory [de-identified] : very loud speech

## 2022-09-15 NOTE — HISTORY OF PRESENT ILLNESS
[FreeTextEntry1] : Patient presents today in his usual state of health. He does not drink coffee, but rather, he takes several caffeine pills per day. He reports they make him feel more awake. He denies chest pains, shortness of breath, or palpitations. He exercises regularly, but he plans to increase the amount of weight training he is doing. \par \par He has a Risperdal induced hyperglycemia. The Risperdal is currently at 25 mg every-two-weeks as court ordered. This court order was rescinded in August 2019.\par \par March 2020 - Patient presents here today for evaluation and medication renewals including his diabetes medications. He has stopped Risperdal, but he has continued metformin, Januvia, and glipizide. He has not had labs checked in almost 18 months.\par May 27, 2020: The patient states that his diabetes and is out of control. He states that he has been taking both the Metformin the glipizide and the Januvia. He has not been to an endocrinologist. He states that his last glucose was 297 despite taking these medicines. He has had an apparent weight loss.\par \par September 2020 - Patient returns today in his usual state of health. He believes he had a recent UTI which was treated with an antibiotic, but he continues to have urethral pain. He was seen by urology, but he does not want to go back there unless he knows he has a UTI.\par Since his last visit here, he fell and hit his head (no loss of consciousness). He attributes this fall to poor glycemic control. \par \par February 2021 - \par TeleHealth Encounter\par Initiated by: Patient election for TeleHealth visit\par Patient was consented for TeleHealth visit\par Patient Location: Home\par Physician Location: Office ([address] [place] [address], [address], [place], [place], [address])\par Duration of Encounter: 30 minutes, at least 50% of which was spent in direct counseling and coordination of care.\par \par Requesting Lunesta 3 mg renewal sent to Sergio [phone], (patient reports he changed from Ambien to Lunesta). \par He believes his [employer] contract is going to be cancelled as of 3/2/2021.\par \par Medicaid [id number], card number [id number]

## 2022-09-19 LAB
25(OH)D3 SERPL-MCNC: 43.7 NG/ML
ALBUMIN SERPL ELPH-MCNC: 4.8 G/DL
ALP BLD-CCNC: 150 U/L
ALT SERPL-CCNC: 17 U/L
ANION GAP SERPL CALC-SCNC: 13 MMOL/L
APPEARANCE: ABNORMAL
AST SERPL-CCNC: 15 U/L
BASOPHILS # BLD AUTO: 0.07 K/UL
BASOPHILS NFR BLD AUTO: 0.7 %
BILIRUB SERPL-MCNC: 0.2 MG/DL
BILIRUBIN URINE: NEGATIVE
BLOOD URINE: NORMAL
BUN SERPL-MCNC: 18 MG/DL
CALCIUM SERPL-MCNC: 9.9 MG/DL
CHLORIDE SERPL-SCNC: 101 MMOL/L
CHOLEST SERPL-MCNC: 196 MG/DL
CO2 SERPL-SCNC: 23 MMOL/L
COLOR: NORMAL
CREAT SERPL-MCNC: 1.85 MG/DL
EGFR: 38 ML/MIN/1.73M2
EOSINOPHIL # BLD AUTO: 0.19 K/UL
EOSINOPHIL NFR BLD AUTO: 1.8 %
ESTIMATED AVERAGE GLUCOSE: 151 MG/DL
GLUCOSE QUALITATIVE U: NEGATIVE
GLUCOSE SERPL-MCNC: 172 MG/DL
HBA1C MFR BLD HPLC: 6.9 %
HCT VFR BLD CALC: 43.4 %
HDLC SERPL-MCNC: 70 MG/DL
HGB BLD-MCNC: 13.7 G/DL
IMM GRANULOCYTES NFR BLD AUTO: 0.4 %
INR PPP: 0.94 RATIO
KETONES URINE: NEGATIVE
LDLC SERPL CALC-MCNC: 116 MG/DL
LDLC SERPL DIRECT ASSAY-MCNC: 115 MG/DL
LEUKOCYTE ESTERASE URINE: ABNORMAL
LYMPHOCYTES # BLD AUTO: 0.66 K/UL
LYMPHOCYTES NFR BLD AUTO: 6.2 %
MAN DIFF?: NORMAL
MCHC RBC-ENTMCNC: 30 PG
MCHC RBC-ENTMCNC: 31.6 GM/DL
MCV RBC AUTO: 95 FL
MONOCYTES # BLD AUTO: 0.55 K/UL
MONOCYTES NFR BLD AUTO: 5.1 %
NEUTROPHILS # BLD AUTO: 9.21 K/UL
NEUTROPHILS NFR BLD AUTO: 85.8 %
NITRITE URINE: NEGATIVE
NONHDLC SERPL-MCNC: 126 MG/DL
PH URINE: 7.5
PLATELET # BLD AUTO: 336 K/UL
POTASSIUM SERPL-SCNC: 5.3 MMOL/L
PROT SERPL-MCNC: 7.4 G/DL
PROTEIN URINE: NORMAL
PSA FREE FLD-MCNC: 14 %
PSA FREE SERPL-MCNC: 0.18 NG/ML
PSA SERPL-MCNC: 1.33 NG/ML
PT BLD: 10.9 SEC
RBC # BLD: 4.57 M/UL
RBC # FLD: 13.3 %
SODIUM SERPL-SCNC: 137 MMOL/L
SPECIFIC GRAVITY URINE: 1.01
TESTOST SERPL-MCNC: 727 NG/DL
TRIGL SERPL-MCNC: 47 MG/DL
TSH SERPL-ACNC: 1.89 UIU/ML
UROBILINOGEN URINE: NORMAL
WBC # FLD AUTO: 10.72 K/UL

## 2022-10-31 ENCOUNTER — APPOINTMENT (OUTPATIENT)
Dept: UROLOGY | Facility: CLINIC | Age: 72
End: 2022-10-31

## 2022-10-31 VITALS — DIASTOLIC BLOOD PRESSURE: 79 MMHG | SYSTOLIC BLOOD PRESSURE: 143 MMHG | HEART RATE: 94 BPM

## 2022-10-31 DIAGNOSIS — R45.1 RESTLESSNESS AND AGITATION: ICD-10-CM

## 2022-10-31 DIAGNOSIS — R82.81 PYURIA: ICD-10-CM

## 2022-10-31 DIAGNOSIS — K40.90 UNILATERAL INGUINAL HERNIA, W/OUT OBSTRUCTION OR GANGRENE, NOT SPECIFIED AS RECURRENT: ICD-10-CM

## 2022-10-31 PROCEDURE — 99214 OFFICE O/P EST MOD 30 MIN: CPT

## 2022-10-31 NOTE — ASSESSMENT
[FreeTextEntry1] : 72 year old male patient H/o BPH presenting with high WBC on urinalysis. h/O symptomatic UTI 2 years ago\par \par Assessment/ Plan: \par \par After an extensive conversation with the patient regarding the finding of pyuria we suggested: :\par  \par 1 - send urine for analysis and culture to see if he has any bacteriuria on culture and treat it before surgery (even if asymptomatic)\par 2- US kidneys andBladder as the patient has high WBC on pervious urine studies and high PVR on prior evaluation 2 years ago\par 3- Patient will call back 2 days from now for results \par 4- follow up in 3 months  \par \par

## 2022-10-31 NOTE — HISTORY OF PRESENT ILLNESS
[FreeTextEntry1] : 72 year old male patient presenting today with urine analysis that showed high WBC count. Patient is scheduled for left hernia repair and states that he  needs antibiotic treatment for the urine.\par \par Patient was seen by dr Joyce 2 years ago for BPH symptoms and was prescribed Flomax but patient didn't take it. The patient apparently was treated for a symptomatic bacterial cystitis in Sept 2020 and had protracted course of urethral pain.  He currently has no urological complaints. He has no frequency, urgency, or dysuria. Review of EMR shows pyuria on prior urine study from last month.\par

## 2022-10-31 NOTE — PHYSICAL EXAM
[General Appearance - Well Developed] : well developed [General Appearance - In No Acute Distress] : no acute distress [Edema] : no peripheral edema [] : no respiratory distress [FreeTextEntry1] : A no

## 2022-11-01 LAB
APPEARANCE: ABNORMAL
BACTERIA: ABNORMAL
BILIRUBIN URINE: NEGATIVE
BLOOD URINE: ABNORMAL
COLOR: YELLOW
GLUCOSE QUALITATIVE U: NEGATIVE
HYALINE CASTS: 2 /LPF
KETONES URINE: NEGATIVE
LEUKOCYTE ESTERASE URINE: ABNORMAL
MICROSCOPIC-UA: NORMAL
NITRITE URINE: NEGATIVE
PH URINE: 6.5
PROTEIN URINE: ABNORMAL
RED BLOOD CELLS URINE: 1 /HPF
SPECIFIC GRAVITY URINE: 1.01
SQUAMOUS EPITHELIAL CELLS: 0 /HPF
UROBILINOGEN URINE: NORMAL
WHITE BLOOD CELLS URINE: 54 /HPF

## 2022-11-03 ENCOUNTER — APPOINTMENT (OUTPATIENT)
Dept: UROLOGY | Facility: CLINIC | Age: 72
End: 2022-11-03

## 2022-11-04 ENCOUNTER — NON-APPOINTMENT (OUTPATIENT)
Age: 72
End: 2022-11-04

## 2022-11-07 ENCOUNTER — NON-APPOINTMENT (OUTPATIENT)
Age: 72
End: 2022-11-07

## 2022-11-07 LAB — BACTERIA UR CULT: ABNORMAL

## 2022-11-07 RX ORDER — SULFAMETHOXAZOLE AND TRIMETHOPRIM 800; 160 MG/1; MG/1
800-160 TABLET ORAL
Qty: 14 | Refills: 0 | Status: ACTIVE | COMMUNITY
Start: 2022-11-07 | End: 1900-01-01

## 2022-12-19 ENCOUNTER — NON-APPOINTMENT (OUTPATIENT)
Age: 72
End: 2022-12-19

## 2022-12-21 RX ORDER — BLOOD SUGAR DIAGNOSTIC
STRIP MISCELLANEOUS
Qty: 90 | Refills: 2 | Status: ACTIVE | COMMUNITY
Start: 2018-07-17 | End: 1900-01-01

## 2023-05-09 ENCOUNTER — RX RENEWAL (OUTPATIENT)
Age: 73
End: 2023-05-09

## 2023-09-19 ENCOUNTER — EMERGENCY (EMERGENCY)
Facility: HOSPITAL | Age: 73
LOS: 1 days | Discharge: ANOTHER HOSPITAL | End: 2023-09-19
Admitting: EMERGENCY MEDICINE
Payer: MEDICARE

## 2023-09-19 VITALS
TEMPERATURE: 99 F | HEART RATE: 102 BPM | DIASTOLIC BLOOD PRESSURE: 88 MMHG | RESPIRATION RATE: 18 BRPM | OXYGEN SATURATION: 95 % | SYSTOLIC BLOOD PRESSURE: 181 MMHG

## 2023-09-19 DIAGNOSIS — F25.9 SCHIZOAFFECTIVE DISORDER, UNSPECIFIED: ICD-10-CM

## 2023-09-19 DIAGNOSIS — Z98.890 OTHER SPECIFIED POSTPROCEDURAL STATES: Chronic | ICD-10-CM

## 2023-09-19 LAB
ALBUMIN SERPL ELPH-MCNC: 4.5 G/DL — SIGNIFICANT CHANGE UP (ref 3.3–5)
ALP SERPL-CCNC: 101 U/L — SIGNIFICANT CHANGE UP (ref 40–120)
ALT FLD-CCNC: 16 U/L — SIGNIFICANT CHANGE UP (ref 4–41)
ANION GAP SERPL CALC-SCNC: 9 MMOL/L — SIGNIFICANT CHANGE UP (ref 7–14)
APAP SERPL-MCNC: <10 UG/ML — LOW (ref 15–25)
AST SERPL-CCNC: 30 U/L — SIGNIFICANT CHANGE UP (ref 4–40)
BASOPHILS # BLD AUTO: 0.06 K/UL — SIGNIFICANT CHANGE UP (ref 0–0.2)
BASOPHILS NFR BLD AUTO: 0.8 % — SIGNIFICANT CHANGE UP (ref 0–2)
BILIRUB SERPL-MCNC: 0.5 MG/DL — SIGNIFICANT CHANGE UP (ref 0.2–1.2)
BUN SERPL-MCNC: 28 MG/DL — HIGH (ref 7–23)
CALCIUM SERPL-MCNC: 9.7 MG/DL — SIGNIFICANT CHANGE UP (ref 8.4–10.5)
CHLORIDE SERPL-SCNC: 99 MMOL/L — SIGNIFICANT CHANGE UP (ref 98–107)
CO2 SERPL-SCNC: 29 MMOL/L — SIGNIFICANT CHANGE UP (ref 22–31)
CREAT SERPL-MCNC: 2.15 MG/DL — HIGH (ref 0.5–1.3)
EGFR: 32 ML/MIN/1.73M2 — LOW
EOSINOPHIL # BLD AUTO: 0.09 K/UL — SIGNIFICANT CHANGE UP (ref 0–0.5)
EOSINOPHIL NFR BLD AUTO: 1.2 % — SIGNIFICANT CHANGE UP (ref 0–6)
ETHANOL SERPL-MCNC: <10 MG/DL — SIGNIFICANT CHANGE UP
GLUCOSE SERPL-MCNC: 141 MG/DL — HIGH (ref 70–99)
HCT VFR BLD CALC: 42.9 % — SIGNIFICANT CHANGE UP (ref 39–50)
HGB BLD-MCNC: 13.9 G/DL — SIGNIFICANT CHANGE UP (ref 13–17)
IANC: 5.96 K/UL — SIGNIFICANT CHANGE UP (ref 1.8–7.4)
IMM GRANULOCYTES NFR BLD AUTO: 0.3 % — SIGNIFICANT CHANGE UP (ref 0–0.9)
LYMPHOCYTES # BLD AUTO: 0.84 K/UL — LOW (ref 1–3.3)
LYMPHOCYTES # BLD AUTO: 10.9 % — LOW (ref 13–44)
MCHC RBC-ENTMCNC: 30.2 PG — SIGNIFICANT CHANGE UP (ref 27–34)
MCHC RBC-ENTMCNC: 32.4 GM/DL — SIGNIFICANT CHANGE UP (ref 32–36)
MCV RBC AUTO: 93.1 FL — SIGNIFICANT CHANGE UP (ref 80–100)
MONOCYTES # BLD AUTO: 0.73 K/UL — SIGNIFICANT CHANGE UP (ref 0–0.9)
MONOCYTES NFR BLD AUTO: 9.5 % — SIGNIFICANT CHANGE UP (ref 2–14)
NEUTROPHILS # BLD AUTO: 5.96 K/UL — SIGNIFICANT CHANGE UP (ref 1.8–7.4)
NEUTROPHILS NFR BLD AUTO: 77.3 % — HIGH (ref 43–77)
NRBC # BLD: 0 /100 WBCS — SIGNIFICANT CHANGE UP (ref 0–0)
NRBC # FLD: 0 K/UL — SIGNIFICANT CHANGE UP (ref 0–0)
PLATELET # BLD AUTO: 338 K/UL — SIGNIFICANT CHANGE UP (ref 150–400)
POTASSIUM SERPL-MCNC: 4.9 MMOL/L — SIGNIFICANT CHANGE UP (ref 3.5–5.3)
POTASSIUM SERPL-SCNC: 4.9 MMOL/L — SIGNIFICANT CHANGE UP (ref 3.5–5.3)
PROT SERPL-MCNC: 7.5 G/DL — SIGNIFICANT CHANGE UP (ref 6–8.3)
RBC # BLD: 4.61 M/UL — SIGNIFICANT CHANGE UP (ref 4.2–5.8)
RBC # FLD: 13.5 % — SIGNIFICANT CHANGE UP (ref 10.3–14.5)
SALICYLATES SERPL-MCNC: <0.3 MG/DL — LOW (ref 15–30)
SARS-COV-2 RNA SPEC QL NAA+PROBE: SIGNIFICANT CHANGE UP
SODIUM SERPL-SCNC: 137 MMOL/L — SIGNIFICANT CHANGE UP (ref 135–145)
TSH SERPL-MCNC: 2.05 UIU/ML — SIGNIFICANT CHANGE UP (ref 0.27–4.2)
WBC # BLD: 7.7 K/UL — SIGNIFICANT CHANGE UP (ref 3.8–10.5)
WBC # FLD AUTO: 7.7 K/UL — SIGNIFICANT CHANGE UP (ref 3.8–10.5)

## 2023-09-19 PROCEDURE — 99285 EMERGENCY DEPT VISIT HI MDM: CPT

## 2023-09-19 PROCEDURE — 93010 ELECTROCARDIOGRAM REPORT: CPT

## 2023-09-19 PROCEDURE — 99285 EMERGENCY DEPT VISIT HI MDM: CPT | Mod: GC

## 2023-09-19 RX ORDER — HALOPERIDOL DECANOATE 100 MG/ML
5 INJECTION INTRAMUSCULAR ONCE
Refills: 0 | Status: COMPLETED | OUTPATIENT
Start: 2023-09-19 | End: 2023-09-19

## 2023-09-19 RX ORDER — DIPHENHYDRAMINE HCL 50 MG
25 CAPSULE ORAL ONCE
Refills: 0 | Status: COMPLETED | OUTPATIENT
Start: 2023-09-19 | End: 2023-09-19

## 2023-09-19 RX ADMIN — HALOPERIDOL DECANOATE 5 MILLIGRAM(S): 100 INJECTION INTRAMUSCULAR at 17:51

## 2023-09-19 RX ADMIN — Medication 1 MILLIGRAM(S): at 19:15

## 2023-09-19 RX ADMIN — Medication 25 MILLIGRAM(S): at 19:15

## 2023-09-19 NOTE — ED ADULT NURSE NOTE - OBJECTIVE STATEMENT
Pt arrives by ems from home, he barricaded himself in apartment, not taking psych medications, hoarding at his home and  speaking nonsensical on arrival. Pt denied si,hi,ah,vh,etoh, drug use. Hx of schizophrenia amd DM2.

## 2023-09-19 NOTE — ED BEHAVIORAL HEALTH ASSESSMENT NOTE - OTHER
+ paranoid and somatic delusions pending bed EMS activated by brother for decreased function including hoarding to the point building is taking legal action against him Brother Mesha Shaikh - [phone]

## 2023-09-19 NOTE — ED BEHAVIORAL HEALTH ASSESSMENT NOTE - SUMMARY
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 , with long history of schizoaffective disorder, bipolar type, with multiple hospitalizations and AOTs, (last hospitalized at [facility] in , then followed by [facility] under AOT for nonadherence; documentation notes chronic delusional interpretation of people's motivations and chronic paranoia despite adherence to Risperidal Consta; lost to follow-up after AOT  in ), no history of SA or substance misuse, PMHx of DM, HLD, HTN, CAD s/p angioplasy, brought in by EMS activated by brother for not being able to reach patient, + legal action by building for hoarding; patient barricaded himself in the apartment and would not allow police in for several hours.    Patient noted to have severe thought disorder with, tangential TP, loosened association, extremely difficult to follow; TC notable for paranoid ideations towards brother and towards others (e.g. police officers/EMS who knocked on his door today, his past psychiatrists and PMD) and somatic delusions (e.g. that previous Risperdal Consta may have elevated his BP and prostate enzymes), but largely pleasant, in behavioral control; despite endorsing "taking care of [him]self" and keeping up with shopping, eating, and fair hygiene, collateral concerning for chronic med nonadherence since AOT  in 2019, social isolation since passing of a close friend Enid in , and increasing inability to care for himself leading to building taking legal action against the patient for severe hoarding; interview notable for paranoid delusions, poor insight and judgement; noted ?pill-rolling tremor of b/l hands without bradykinesia, masked facies, unclear if volitional vs. PD vs. pseudoparkinsonism 2/2 many years of antipsychotic use; consider formal neuropsych testing before reinitiating neuroleptics

## 2023-09-19 NOTE — ED BEHAVIORAL HEALTH ASSESSMENT NOTE - RISK ASSESSMENT
Chronic risk factors include male sex, h/o psychiatric hospitalization, h/o AOT, h/o nonadherence, h/o mood/psychotic disorder, medical comorbidities, h/o chronic delusions even when compliant on Risperdal Cosnta    Acute risk factors include psychosis without CAH, rejection towards help, social isolation, nonadherence, not in care, poor insight/judgement, + legal action by building, severe hoarding    Protective factors include residential stability, intelligent, no h/o SI or SA     Given above, patient is unable to care for himself and does warrant inpatient hospitalization.

## 2023-09-19 NOTE — ED BEHAVIORAL HEALTH ASSESSMENT NOTE - DESCRIPTION
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 Vital signs initially hypertensive to 181/88, but later stabilized, see below. Patient calm and in behavioral control, but refusing bloodwork. PRN Haldol 5 mg IM given for labs.    Vital Signs Last 24 Hrs  T(C): 36.9 (19 Sep 2023 16:04), Max: 37.3 (19 Sep 2023 15:24)  T(F): 98.5 (19 Sep 2023 16:04), Max: 99.1 (19 Sep 2023 15:24)  HR: 86 (19 Sep 2023 16:04) (86 - 102)  BP: 144/66 (19 Sep 2023 16:04) (144/66 - 181/88)  BP(mean): --  RR: 17 (19 Sep 2023 16:04) (17 - 18)  SpO2: 98% (19 Sep 2023 16:04) (95% - 98%)    Parameters below as of 19 Sep 2023 16:04  Patient On (Oxygen Delivery Method): room air

## 2023-09-19 NOTE — ED BEHAVIORAL HEALTH ASSESSMENT NOTE - NSBHMSEINSIGHT_PSY_A_CORE
RN cannot approve Refill Request    RN can NOT refill this medication med is not covered by policy/route to provider. Last office visit: 10/11/2018 Agustín Craft MD Last Physical: 11/1/2017 Last MTM visit: Visit date not found Last visit same specialty: 10/11/2018 Agustín Craft MD.  Next visit within 3 mo: Visit date not found  Next physical within 3 mo: Visit date not found      Ashli Carvalho, [facility] Triage/Med Refill 1/11/2019    Requested Prescriptions   Pending Prescriptions Disp Refills     methocarbamol (ROBAXIN) 750 MG tablet [Pharmacy Med Name: METHOCARBAMOL 750 MG TABLET] 90 tablet 0     Sig: TAKE 1 TABLET (750 MG) BY MOUTH EVERY EIGHT HOURS AS NEEDED.    There is no refill protocol information for this order            Poor

## 2023-09-19 NOTE — ED BEHAVIORAL HEALTH ASSESSMENT NOTE - REASON FOR REFERRAL
Barricading self in apartment; severe hoarding to the point of building taking legal action against him

## 2023-09-19 NOTE — ED PROVIDER NOTE - PROGRESS NOTE DETAILS
VONDA: Pt was signed out to me pending admission/transfer to an inpatient psych facility. Pt has no complaints but noted to have Cr elevated from previous in 2017. Will have pt PO hydrate and recheck BMP/Cr as likely pt has dehydration given his condition and likely not taking PO adequately. Will continue to monitor pending bed availability.

## 2023-09-19 NOTE — ED BEHAVIORAL HEALTH ASSESSMENT NOTE - NSBHMSEMOVE_PSY_A_CORE
Rolling motions of b/l thumb and index finger, unclear if volitional vs. pill rolling tremor of PD/Tremors

## 2023-09-19 NOTE — ED BEHAVIORAL HEALTH ASSESSMENT NOTE - NSBHATTESTCOMMENTATTENDFT_PSY_A_CORE
73M, domiciled alone in [place] in apartment owned by brother, never , no children, , with long history of schizoaffective disorder, bipolar type, with multiple hospitalizations and AOTs, (last hospitalized at [facility] in , then followed by [facility] under AOT for nonadherence; documentation notes chronic delusional interpretation of people's motivations and chronic paranoia despite adherence to Risperidal Consta; lost to follow-up after AOT  in 2019), no history of SA or substance misuse, PMHx of DM, HLD, HTN, CAD s/p angioplasy, brought in by EMS activated by brother for not being able to reach patient, + legal action by building for hoarding; patient barricaded himself in the apartment and would not allow police in for several hours.    On exam, patient is irritable, uncooperative, minimizing events leading to ED visit, patient is very paranoid, with disorganized thought process, became agitated during interview, patient with impaired insight, stating that he doesn't have a mental illness despiote history of AOT. His judgement is grossly impaired, patient is unable to care for self in the community, he is in need of psychiatric hospitalization for safety, stabilization, and treatment.

## 2023-09-19 NOTE — ED BEHAVIORAL HEALTH ASSESSMENT NOTE - UNABLE TO CARE FOR SELF DETAILS
chronic med nonadherence since AOT  in , social isolation since passing of a close friend Enid in , and increasing inability to care for himself leading to building taking legal action against the patient for severe hoarding; interview notable for paranoid delusions, poor insight and judgement.

## 2023-09-19 NOTE — ED BEHAVIORAL HEALTH ASSESSMENT NOTE - PSYCHIATRIC ISSUES AND PLAN (INCLUDE STANDING AND PRN MEDICATION)
noted ?pill-rolling tremor of b/l hands without bradykinesia, masked facies, unclear if volitional vs. PD vs. pseudoparkinsonism 2/2 many years of antipsychotic use; consider formal neuropsych testing before reinitiating neuroleptics; previously on Risperdal Consta 25 mg CRESPO up to 2019 with remaining chronic delusions
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
## 2023-09-19 NOTE — ED BEHAVIORAL HEALTH ASSESSMENT NOTE - DETAILS
discussed with brother pending bed Defer in ED setting, patient with psychotic sxs Denies current or recent SI. Denies h/o SA. per brother, schizophrenia on mother’s side and paternal grandmother. Dr. Sommers Dr. Wagner

## 2023-09-19 NOTE — ED PROVIDER NOTE - CLINICAL SUMMARY MEDICAL DECISION MAKING FREE TEXT BOX
73-year-old male with past medical history of schizo affective disorder and diabetes not currently taking medications was brought in by EMS and PD after brother called for poor living conditions and patient noncompliant with medications.  As per PD patient living in hoarding living condition.  After 5 hours PD broke out of the store and patient had to come to the emergency department.  Patient appears paranoid and disorganized patient reports that his medication cause his medical ailments.

## 2023-09-19 NOTE — ED BEHAVIORAL HEALTH ASSESSMENT NOTE - OTHER PAST PSYCHIATRIC HISTORY (INCLUDE DETAILS REGARDING ONSET, COURSE OF ILLNESS, INPATIENT/OUTPATIENT TREATMENT)
long history of schizoaffective disorder, bipolar type, with multiple hospitalizations and AOTs, (last hospitalized at [facility] in , then followed by [facility] under AOT for nonadherence; documentation notes chronic delusional interpretation of people's motivations and chronic paranoia despite adherence to Risperidal Consta; lost to follow-up after AOT  in ), no history of SA or substance misuse

## 2023-09-19 NOTE — ED ADULT TRIAGE NOTE - CHIEF COMPLAINT QUOTE
brother called EMS because pt was not taking medication arrives with EMS and [employer] pt self barricaded apartment and it took 5 hours as per EMS residence appears to a hoarding home  pt is speaking nonsensical at triage time

## 2023-09-19 NOTE — ED PROVIDER NOTE - OBJECTIVE STATEMENT
73-year-old male with past medical history of schizo affective disorder and diabetes not currently taking medications was brought in by EMS and PD after brother called for poor living conditions and patient noncompliant with medications.  As per PD patient living in hoarding living condition.  After 5 hours PD broke out of the store and patient had to come to the emergency department.  Patient appears paranoid and disorganized patient reports that his medication cause his medical ailments. denies current SI, HI, AVH, chest pain, shortness of breath, abdominal pain, urinary complaints 73-year-old male with past medical history of schizo affective disorder and diabetes not currently taking medications was brought in by EMS and PD after brother called for poor living conditions and patient noncompliant with medications.  As per PD patient living in hoarding living condition.  After 5 hours PD broke down the door and patient was brought to the emergency department.  Patient appears paranoid and disorganized patient reports that his medication cause his medical ailments. denies current SI, HI, AVH, chest pain, shortness of breath, abdominal pain, urinary complaints

## 2023-09-19 NOTE — ED BEHAVIORAL HEALTH ASSESSMENT NOTE - MEDICAL ISSUES AND PLAN (INCLUDE STANDING AND PRN MEDICATION)
Patient h/o DM, HLD, HTN, CAD s/p angioplasy but not currently taking medications, last seen PMD 1 year ago; consider hospitalist consult, start patient on appropriate medications for these stable chronic conditions

## 2023-09-19 NOTE — ED BEHAVIORAL HEALTH ASSESSMENT NOTE - NSBHMSEBEHAV_PSY_A_CORE
Became hostile to attending psychiatrist when informed of involuntary hospitalization, but remained in behavioral control/Cooperative/Hostile

## 2023-09-20 VITALS
TEMPERATURE: 98 F | OXYGEN SATURATION: 98 % | DIASTOLIC BLOOD PRESSURE: 59 MMHG | HEART RATE: 68 BPM | RESPIRATION RATE: 17 BRPM | SYSTOLIC BLOOD PRESSURE: 120 MMHG

## 2023-09-20 LAB
ANION GAP SERPL CALC-SCNC: 11 MMOL/L — SIGNIFICANT CHANGE UP (ref 7–14)
BUN SERPL-MCNC: 26 MG/DL — HIGH (ref 7–23)
CALCIUM SERPL-MCNC: 9.2 MG/DL — SIGNIFICANT CHANGE UP (ref 8.4–10.5)
CHLORIDE SERPL-SCNC: 102 MMOL/L — SIGNIFICANT CHANGE UP (ref 98–107)
CO2 SERPL-SCNC: 24 MMOL/L — SIGNIFICANT CHANGE UP (ref 22–31)
CREAT SERPL-MCNC: 1.86 MG/DL — HIGH (ref 0.5–1.3)
EGFR: 38 ML/MIN/1.73M2 — LOW
GLUCOSE SERPL-MCNC: 119 MG/DL — HIGH (ref 70–99)
POTASSIUM SERPL-MCNC: 4.5 MMOL/L — SIGNIFICANT CHANGE UP (ref 3.5–5.3)
POTASSIUM SERPL-SCNC: 4.5 MMOL/L — SIGNIFICANT CHANGE UP (ref 3.5–5.3)
SODIUM SERPL-SCNC: 137 MMOL/L — SIGNIFICANT CHANGE UP (ref 135–145)

## 2023-09-20 PROCEDURE — 99211 OFF/OP EST MAY X REQ PHY/QHP: CPT

## 2023-09-20 RX ORDER — HALOPERIDOL DECANOATE 100 MG/ML
5 INJECTION INTRAMUSCULAR ONCE
Refills: 0 | Status: COMPLETED | OUTPATIENT
Start: 2023-09-20 | End: 2023-09-20

## 2023-09-20 RX ADMIN — HALOPERIDOL DECANOATE 5 MILLIGRAM(S): 100 INJECTION INTRAMUSCULAR at 12:20

## 2023-09-20 RX ADMIN — Medication 2 MILLIGRAM(S): at 12:20

## 2023-09-20 NOTE — ED BEHAVIORAL HEALTH PROGRESS NOTE - CASE SUMMARY/FORMULATION (CLEARLY DOCUMENT RATIONALE FOR DISPOSITION CHANGE)
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 , with long history of schizoaffective disorder, bipolar type, with multiple hospitalizations and AOTs, (last hospitalized at [facility] in , then followed by [facility] under AOT for nonadherence; documentation notes chronic delusional interpretation of people's motivations and chronic paranoia despite adherence to Risperidal Consta; lost to follow-up after AOT  in ), no history of SA or substance misuse, PMHx of DM, HLD, HTN, CAD s/p angioplasy, brought in by EMS activated by brother for not being able to reach patient, + legal action by building for hoarding; patient barricaded himself in the apartment and would not allow police in for several hours.    Patient noted to have severe thought disorder with, tangential TP, loosened association, extremely difficult to follow; TC notable for paranoid ideations towards brother and towards others (e.g. police officers/EMS who knocked on his door today, his past psychiatrists and PMD) and somatic delusions (e.g. that previous Risperdal Consta may have elevated his BP and prostate enzymes), but largely pleasant, in behavioral control; despite endorsing "taking care of [him]self" and keeping up with shopping, eating, and fair hygiene, collateral concerning for chronic med nonadherence since AOT  in 2019, social isolation since passing of a close friend Enid in , and increasing inability to care for himself leading to building taking legal action against the patient for severe hoarding; interview notable for paranoid delusions, poor insight and judgement; noted ?pill-rolling tremor of b/l hands without bradykinesia, masked facies, unclear if volitional vs. PD vs. pseudoparkinsonism 2/2 many years of antipsychotic use; consider formal neuropsych testing before reinitiating neuroleptics.

## 2023-09-20 NOTE — ED BEHAVIORAL HEALTH PROGRESS NOTE - SUMMARY
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 , with long history of schizoaffective disorder, bipolar type, with multiple hospitalizations and AOTs, (last hospitalized at [facility] in , then followed by [facility] under AOT for nonadherence; documentation notes chronic delusional interpretation of people's motivations and chronic paranoia despite adherence to Risperidal Consta; lost to follow-up after AOT  in ), no history of SA or substance misuse, PMHx of DM, HLD, HTN, CAD s/p angioplasy, brought in by EMS activated by brother for not being able to reach patient, + legal action by building for hoarding; patient barricaded himself in the apartment and would not allow police in for several hours.    Patient noted to have severe thought disorder with, tangential TP, loosened association, extremely difficult to follow; TC notable for paranoid ideations towards brother and towards others (e.g. police officers/EMS who knocked on his door today, his past psychiatrists and PMD) and somatic delusions (e.g. that previous Risperdal Consta may have elevated his BP and prostate enzymes), but largely pleasant, in behavioral control; despite endorsing "taking care of [him]self" and keeping up with shopping, eating, and fair hygiene, collateral concerning for chronic med nonadherence since AOT  in 2019, social isolation since passing of a close friend Enid in , and increasing inability to care for himself leading to building taking legal action against the patient for severe hoarding; interview notable for paranoid delusions, poor insight and judgement; noted ?pill-rolling tremor of b/l hands without bradykinesia, masked facies, unclear if volitional vs. PD vs. pseudoparkinsonism 2/2 many years of antipsychotic use; consider formal neuropsych testing before reinitiating neuroleptics.

## 2023-09-20 NOTE — ED ADULT NURSE REASSESSMENT NOTE - NS ED NURSE REASSESS COMMENT FT1
Pt awakened, calm and cooperative. PO Hydration via pitcher provided as ordered. Pt ambulated to bathroom unassisted. VSS. Will continue to monitor for safety.

## 2023-09-20 NOTE — ED BEHAVIORAL HEALTH NOTE - BEHAVIORAL HEALTH NOTE
worker called patient's brother Richardson ([phone]) and informed of patient transfer to [facility].

## 2023-09-20 NOTE — ED BEHAVIORAL HEALTH NOTE - BEHAVIORAL HEALTH NOTE
[facility] accepting physician Zoya Wagner.  Insurance auth:   [facility] [id number]   [place]  Auth: [id number]  Sept 20- 22, 2023.   to be assigned.

## 2023-09-20 NOTE — ED BEHAVIORAL HEALTH NOTE - BEHAVIORAL HEALTH NOTE
Richardson - [phone]     He has an apartment is a mess but he's ensuring that it w  Brother is taking care of it and discussing it with the .   As soon the apartment is clean, legal action will be dropped.   Pt will NOT BE HOMELESS.       Dr. Wagner - [phone]  Patient accepted to .     [facility] - [phone]  DR. JEM WAGNER

## 2023-11-21 ENCOUNTER — RX RENEWAL (OUTPATIENT)
Age: 73
End: 2023-11-21

## 2023-11-22 ENCOUNTER — RX RENEWAL (OUTPATIENT)
Age: 73
End: 2023-11-22

## 2023-11-28 RX ORDER — BLOOD-GLUCOSE METER
W/DEVICE KIT MISCELLANEOUS
Qty: 1 | Refills: 0 | Status: ACTIVE | COMMUNITY
Start: 2023-11-28

## 2023-11-28 RX ORDER — BLOOD SUGAR DIAGNOSTIC
STRIP MISCELLANEOUS DAILY
Qty: 1 | Refills: 2 | Status: ACTIVE | COMMUNITY
Start: 2023-11-28

## 2023-11-28 RX ORDER — BLOOD SUGAR DIAGNOSTIC
STRIP MISCELLANEOUS DAILY
Qty: 1 | Refills: 0 | Status: DISCONTINUED | COMMUNITY
Start: 2017-10-16 | End: 2023-11-28

## 2023-11-28 RX ORDER — BLOOD SUGAR DIAGNOSTIC
STRIP MISCELLANEOUS
Qty: 1 | Refills: 0 | Status: DISCONTINUED | COMMUNITY
Start: 2019-01-08 | End: 2023-11-28

## 2023-11-28 RX ORDER — BLOOD-GLUCOSE METER
W/DEVICE EACH MISCELLANEOUS
Qty: 1 | Refills: 0 | Status: DISCONTINUED | COMMUNITY
Start: 2017-10-16 | End: 2023-11-28

## 2023-11-30 ENCOUNTER — RX RENEWAL (OUTPATIENT)
Age: 73
End: 2023-11-30

## 2023-12-01 ENCOUNTER — NON-APPOINTMENT (OUTPATIENT)
Age: 73
End: 2023-12-01

## 2023-12-01 ENCOUNTER — APPOINTMENT (OUTPATIENT)
Dept: CARDIOLOGY | Facility: CLINIC | Age: 73
End: 2023-12-01
Payer: MEDICARE

## 2023-12-01 VITALS
OXYGEN SATURATION: 95 % | BODY MASS INDEX: 28.95 KG/M2 | HEIGHT: 68 IN | DIASTOLIC BLOOD PRESSURE: 70 MMHG | HEART RATE: 75 BPM | WEIGHT: 191 LBS | SYSTOLIC BLOOD PRESSURE: 140 MMHG

## 2023-12-01 DIAGNOSIS — E78.5 HYPERLIPIDEMIA, UNSPECIFIED: ICD-10-CM

## 2023-12-01 DIAGNOSIS — I10 ESSENTIAL (PRIMARY) HYPERTENSION: ICD-10-CM

## 2023-12-01 PROCEDURE — 99214 OFFICE O/P EST MOD 30 MIN: CPT

## 2023-12-01 PROCEDURE — 93000 ELECTROCARDIOGRAM COMPLETE: CPT

## 2023-12-02 LAB
ALBUMIN SERPL ELPH-MCNC: 4.5 G/DL
ALP BLD-CCNC: 168 U/L
ALT SERPL-CCNC: 14 U/L
ANION GAP SERPL CALC-SCNC: 14 MMOL/L
AST SERPL-CCNC: 15 U/L
BILIRUB SERPL-MCNC: 0.3 MG/DL
BUN SERPL-MCNC: 26 MG/DL
CALCIUM SERPL-MCNC: 10 MG/DL
CHLORIDE SERPL-SCNC: 103 MMOL/L
CHOLEST SERPL-MCNC: 190 MG/DL
CO2 SERPL-SCNC: 20 MMOL/L
CREAT SERPL-MCNC: 1.61 MG/DL
EGFR: 45 ML/MIN/1.73M2
ESTIMATED AVERAGE GLUCOSE: 183 MG/DL
GLUCOSE SERPL-MCNC: 124 MG/DL
HBA1C MFR BLD HPLC: 8 %
HDLC SERPL-MCNC: 56 MG/DL
LDLC SERPL CALC-MCNC: 113 MG/DL
NONHDLC SERPL-MCNC: 134 MG/DL
POTASSIUM SERPL-SCNC: 4.5 MMOL/L
PROT SERPL-MCNC: 7.2 G/DL
PSA FREE FLD-MCNC: 15 %
PSA FREE SERPL-MCNC: 0.18 NG/ML
PSA SERPL-MCNC: 1.22 NG/ML
SODIUM SERPL-SCNC: 137 MMOL/L
T4 SERPL-MCNC: 8.1 UG/DL
TRIGL SERPL-MCNC: 116 MG/DL
TSH SERPL-ACNC: 1.48 UIU/ML

## 2023-12-04 ENCOUNTER — NON-APPOINTMENT (OUTPATIENT)
Age: 73
End: 2023-12-04

## 2025-06-16 ENCOUNTER — APPOINTMENT (OUTPATIENT)
Dept: CARDIOLOGY | Facility: CLINIC | Age: 75
End: 2025-06-16
Payer: MEDICARE

## 2025-06-16 VITALS
OXYGEN SATURATION: 96 % | BODY MASS INDEX: 26.91 KG/M2 | SYSTOLIC BLOOD PRESSURE: 168 MMHG | DIASTOLIC BLOOD PRESSURE: 70 MMHG | HEART RATE: 91 BPM | WEIGHT: 177 LBS

## 2025-06-16 PROBLEM — E11.29 TYPE 2 DIABETES MELLITUS WITH OTHER KIDNEY COMPLICATION: Status: ACTIVE | Noted: 2025-06-16

## 2025-06-16 PROBLEM — N52.9 ERECTILE DISORDER: Status: ACTIVE | Noted: 2025-06-16

## 2025-06-16 PROCEDURE — G2211 COMPLEX E/M VISIT ADD ON: CPT

## 2025-06-16 PROCEDURE — 99214 OFFICE O/P EST MOD 30 MIN: CPT

## 2025-06-16 PROCEDURE — 93000 ELECTROCARDIOGRAM COMPLETE: CPT

## 2025-06-16 RX ORDER — TADALAFIL 10 MG/1
10 TABLET, FILM COATED ORAL
Qty: 60 | Refills: 1 | Status: ACTIVE | COMMUNITY
Start: 2025-06-16 | End: 1900-01-01

## 2025-06-16 RX ORDER — SEMAGLUTIDE 0.68 MG/ML
2 INJECTION, SOLUTION SUBCUTANEOUS
Qty: 4 | Refills: 3 | Status: ACTIVE | COMMUNITY
Start: 2025-06-16 | End: 1900-01-01